# Patient Record
(demographics unavailable — no encounter records)

---

## 2024-10-09 NOTE — PLAN
[FreeTextEntry1] : 30 yo s/p DE doing well.  1.Dilation and Evacuation -Patient is recovering well.  No signs/symptoms of infection.  -Reviewed pathology from procedure -Reviewed that first period may be heavier than normal.  -Patient is cleared to return to all physical activities  2.Contraception/Conception - declines contraception; aware of rapid return to fertility  3.  Psych - no acute concerns  4. Follow-up - Patient referred back to her primary Ob-gyn, Dr. Soler for routine care - Copies of medical records to be forwarded to Dr. Soler - - all questions/concerns addressed of pt to their satisfaction

## 2024-10-09 NOTE — HISTORY OF PRESENT ILLNESS
[FreeTextEntry1] : This visit was provided via telehealth using 2-way audio and visual technology. The patient,  and provider, Fer Recinos, were both located in [facility] and both participated in the telehealth encounter. Verbal consent 10/9/24 at 1540 by, patient MOHIT SOLOMON.  Pt location: [place], [place] Provider location: [address] [place] [address]  30 yo s/p DE at 17 weeks doing well. Minimal bleeding, no pain.